# Patient Record
Sex: FEMALE | Employment: OTHER | URBAN - METROPOLITAN AREA
[De-identification: names, ages, dates, MRNs, and addresses within clinical notes are randomized per-mention and may not be internally consistent; named-entity substitution may affect disease eponyms.]

---

## 2024-06-10 PROBLEM — C85.92: Status: ACTIVE | Noted: 2024-06-10

## 2024-06-10 PROBLEM — I10 ESSENTIAL HYPERTENSION: Status: ACTIVE | Noted: 2024-06-10

## 2024-06-10 PROBLEM — R42 LIGHTHEADED: Status: ACTIVE | Noted: 2024-06-10

## 2024-06-10 PROBLEM — I49.9 CARDIAC ARRHYTHMIA: Status: ACTIVE | Noted: 2024-06-10

## 2024-06-10 PROBLEM — C82.58: Status: ACTIVE | Noted: 2018-06-12

## 2024-06-10 PROBLEM — I38 VHD (VALVULAR HEART DISEASE): Status: ACTIVE | Noted: 2024-06-10

## 2024-06-10 PROBLEM — Z80.0 FAMILY HX OF COLON CANCER: Status: ACTIVE | Noted: 2024-06-10

## 2024-06-10 PROBLEM — M25.512 ACUTE PAIN OF LEFT SHOULDER: Status: ACTIVE | Noted: 2018-08-23

## 2024-06-10 PROBLEM — R00.2 PALPITATIONS: Status: ACTIVE | Noted: 2024-06-10

## 2024-06-10 PROBLEM — Z95.0 CARDIAC PACEMAKER: Status: ACTIVE | Noted: 2024-06-10

## 2024-06-10 RX ORDER — LOSARTAN POTASSIUM 50 MG/1
50 TABLET ORAL DAILY
COMMUNITY

## 2024-06-10 RX ORDER — POLYETHYLENE GLYCOL 3350 17 G/17G
17 POWDER, FOR SOLUTION ORAL DAILY
COMMUNITY

## 2024-06-10 RX ORDER — METOPROLOL SUCCINATE 25 MG/1
25 TABLET, EXTENDED RELEASE ORAL DAILY
COMMUNITY

## 2024-06-10 RX ORDER — ACETAMINOPHEN 500 MG
5000 TABLET ORAL DAILY
COMMUNITY

## 2024-06-10 RX ORDER — VITAMIN E MIXED 400 UNIT
400 CAPSULE ORAL DAILY
COMMUNITY

## 2024-06-10 RX ORDER — PNV NO.95/FERROUS FUM/FOLIC AC 28MG-0.8MG
100 TABLET ORAL DAILY
COMMUNITY

## 2024-06-10 RX ORDER — CALCIUM CARBONATE 300MG(750)
400 TABLET,CHEWABLE ORAL DAILY
COMMUNITY

## 2024-06-10 RX ORDER — COD LIVER OIL
OIL (ML) ORAL
COMMUNITY

## 2024-06-10 RX ORDER — HYDROGEN PEROXIDE 3 %
20 SOLUTION, NON-ORAL MISCELLANEOUS
COMMUNITY

## 2024-06-10 RX ORDER — DABIGATRAN ETEXILATE 150 MG/1
150 CAPSULE ORAL 2 TIMES DAILY
COMMUNITY

## 2024-06-10 RX ORDER — POTASSIUM &MAGNESIUM ASPARTATE 250-250 MG
1 CAPSULE ORAL DAILY
COMMUNITY

## 2024-06-13 ENCOUNTER — OFFICE VISIT (OUTPATIENT)
Dept: CARDIOLOGY | Facility: CLINIC | Age: 89
End: 2024-06-13
Payer: MEDICARE

## 2024-06-13 VITALS
HEIGHT: 63 IN | WEIGHT: 170 LBS | HEART RATE: 64 BPM | SYSTOLIC BLOOD PRESSURE: 112 MMHG | DIASTOLIC BLOOD PRESSURE: 66 MMHG | BODY MASS INDEX: 30.12 KG/M2

## 2024-06-13 DIAGNOSIS — R00.2 PALPITATIONS: Primary | ICD-10-CM

## 2024-06-13 PROCEDURE — 93000 ELECTROCARDIOGRAM COMPLETE: CPT | Performed by: INTERNAL MEDICINE

## 2024-06-13 PROCEDURE — 3078F DIAST BP <80 MM HG: CPT | Performed by: INTERNAL MEDICINE

## 2024-06-13 PROCEDURE — 3074F SYST BP LT 130 MM HG: CPT | Performed by: INTERNAL MEDICINE

## 2024-06-13 PROCEDURE — 99204 OFFICE O/P NEW MOD 45 MIN: CPT | Performed by: INTERNAL MEDICINE

## 2024-06-13 PROCEDURE — 1159F MED LIST DOCD IN RCRD: CPT | Performed by: INTERNAL MEDICINE

## 2024-06-13 PROCEDURE — 1036F TOBACCO NON-USER: CPT | Performed by: INTERNAL MEDICINE

## 2024-06-13 NOTE — PROGRESS NOTES
[facility] Electrophysiology    Patient Name: Joi Walker  Patient : 10/8/1930    Referred  for   Chief Complaint   Patient presents with    New Patient Visit        Joi Walker is a 89 y.o. year old female patient with    Syncope with bifascicular block: According to chart, underwent AV MICRA implant in [place] due to syncope and second degree AV block. Overall has been feeling well since although had recurrent near syncope afterwards and apparently started on beta blockers due to VT on interrogation?. No episodes since. Feels well except feeling tired.      Past Medical History:  She has a past medical history of Arrhythmia, Deep vein thrombosis (Multi), and GERD (gastroesophageal reflux disease).    Past Surgical History:  She has a past surgical history that includes Hysterectomy; Cholecystectomy; and Insert / replace / remove pacemaker.      Social History:  She reports that she has never smoked. She has never used smokeless tobacco. She reports that she does not currently use alcohol. She reports that she does not use drugs.    Family History:  No family history on file.     Allergies:  Iodinated contrast media, Codeine, Epinephrine, Iodine, and Prednisone    Outpatient Medications:  Current Outpatient Medications   Medication Instructions    cholecalciferol (VITAMIN D3) 5,000 Units, oral, Daily    cod liver oiL oil oral    cranberry 500 mg capsule 1 capsule, oral, Daily    cyanocobalamin (VITAMIN B-12) 100 mcg, oral, Daily    dabigatran etexilate (PRADAXA) 150 mg, oral, 2 times daily, Do not crush or chew.    docosahexaenoic acid/epa (FISH OIL ORAL) 1 capsule, oral, Daily    esomeprazole (NEXIUM) 20 mg, oral, Daily before breakfast, Do not open capsule.    losartan (COZAAR) 50 mg, oral, Daily    magnesium oxide (MAG-OX) 400 mg, oral, Daily    metoprolol succinate XL (TOPROL-XL) 25 mg, oral, Daily, Do not crush or chew.    polyethylene glycol (GLYCOLAX, MIRALAX) 17 g, oral, Daily     "saccharomyces boulardii (Florastor) 250 mg capsule 250 mg, oral, Daily    vitamin E 400 Units, oral, Daily    wheat dextrin (BENEFIBER CLEAR SF, DEXTRIN, ORAL) oral    ZINC ACETATE ORAL Mouth/Throat        ROS:  A 14 point review of systems was done and is negative other than as stated in HPI    Vitals:  Vitals:    06/13/24 0833   BP: 112/66   Pulse: 64   Weight: 77.1 kg (170 lb)   Height: 1.588 m (5' 2.5\")       Physical Exam:   Physical Exam  Cardiovascular:      Rate and Rhythm: Normal rate and regular rhythm.   Pulmonary:      Effort: Pulmonary effort is normal.   Musculoskeletal:         General: No swelling.   Skin:     Findings: No rash.   Neurological:      General: No focal deficit present.      Mental Status: She is alert and oriented to person, place, and time.   Psychiatric:         Mood and Affect: Mood normal.          Intake/Output:   No intake/output data recorded.    Outpatient Medications  Current Outpatient Medications on File Prior to Visit   Medication Sig Dispense Refill    cholecalciferol (Vitamin D3) 5,000 Units tablet Take 1 tablet (5,000 Units) by mouth once daily.      cod liver oiL oil Take by mouth.      cranberry 500 mg capsule Take 1 capsule by mouth once daily.      cyanocobalamin (Vitamin B-12) 100 mcg tablet Take 1 tablet (100 mcg) by mouth once daily.      dabigatran etexilate (Pradaxa) 150 mg capsule Take 1 capsule (150 mg) by mouth 2 times a day. Do not crush or chew.      docosahexaenoic acid/epa (FISH OIL ORAL) Take 1 capsule by mouth once daily.      esomeprazole (NexIUM) 20 mg DR capsule Take 1 capsule (20 mg) by mouth once daily in the morning. Take before meals. Do not open capsule.      losartan (Cozaar) 50 mg tablet Take 1 tablet (50 mg) by mouth once daily.      magnesium oxide (Mag-Ox) 400 mg tablet Take 1 tablet (400 mg) by mouth once daily.      metoprolol succinate XL (Toprol-XL) 25 mg 24 hr tablet Take 1 tablet (25 mg) by mouth once daily. Do not crush or chew.      " polyethylene glycol (Glycolax, Miralax) 17 gram packet Take 17 g by mouth once daily.      vitamin E 180 mg (400 unit) capsule Take 1 capsule (400 Units) by mouth once daily.      wheat dextrin (BENEFIBER CLEAR SF, DEXTRIN, ORAL) Take by mouth.      ZINC ACETATE ORAL Use in the mouth or throat.      saccharomyces boulardii (Florastor) 250 mg capsule Take 250 mg by mouth once daily.       No current facility-administered medications on file prior to visit.       Labs: (past 26 weeks)  No results found for this or any previous visit (from the past 4368 hour(s)).    ECG  No results found for this or any previous visit (from the past 4464 hour(s)).    Echocardiogram  No results found for this or any previous visit from the past 1095 days.      Assessment/Plan:     Patient s/p MICRA implant will refer to our device clinic for followup. With regards to her lightheadedness/near syncope, I am unsure if this is related to a cardiac arrhythmia. Nevertheless, I would not make any further changes to her medications. Will observe via device clinic.

## 2024-08-05 ENCOUNTER — HOSPITAL ENCOUNTER (OUTPATIENT)
Dept: CARDIOLOGY | Facility: HOSPITAL | Age: 89
Discharge: HOME | End: 2024-08-05
Payer: MEDICARE

## 2024-08-05 DIAGNOSIS — R94.31 ABNORMAL ELECTROCARDIOGRAM (ECG) (EKG): ICD-10-CM

## 2024-08-05 DIAGNOSIS — Z95.0 PRESENCE OF CARDIAC PACEMAKER: Primary | ICD-10-CM

## 2024-08-05 DIAGNOSIS — Z95.0 PRESENCE OF CARDIAC PACEMAKER: ICD-10-CM

## 2024-08-05 DIAGNOSIS — R00.2 PALPITATIONS: ICD-10-CM

## 2024-08-05 PROCEDURE — 93279 PRGRMG DEV EVAL PM/LDLS PM: CPT

## 2024-09-09 ENCOUNTER — HOSPITAL ENCOUNTER (OUTPATIENT)
Dept: CARDIOLOGY | Facility: HOSPITAL | Age: 89
Discharge: HOME | End: 2024-09-09
Payer: MEDICARE

## 2024-09-09 DIAGNOSIS — Z95.0 PRESENCE OF CARDIAC PACEMAKER: ICD-10-CM

## 2024-09-09 DIAGNOSIS — R00.2 PALPITATIONS: ICD-10-CM

## 2024-09-09 DIAGNOSIS — Z95.0 PRESENCE OF CARDIAC PACEMAKER: Primary | ICD-10-CM

## 2024-10-24 ENCOUNTER — HOSPITAL ENCOUNTER (OUTPATIENT)
Dept: CARDIOLOGY | Facility: HOSPITAL | Age: 89
Discharge: HOME | End: 2024-10-24
Payer: MEDICARE

## 2024-10-24 DIAGNOSIS — R00.2 PALPITATIONS: ICD-10-CM

## 2024-10-24 DIAGNOSIS — Z95.0 PRESENCE OF CARDIAC PACEMAKER: ICD-10-CM

## 2024-12-11 ENCOUNTER — HOSPITAL ENCOUNTER (OUTPATIENT)
Dept: CARDIOLOGY | Facility: HOSPITAL | Age: 89
Discharge: HOME | End: 2024-12-11
Payer: MEDICARE

## 2024-12-11 DIAGNOSIS — R00.2 PALPITATIONS: ICD-10-CM

## 2024-12-11 DIAGNOSIS — Z95.0 PRESENCE OF CARDIAC PACEMAKER: Primary | ICD-10-CM

## 2024-12-11 DIAGNOSIS — Z95.0 PRESENCE OF CARDIAC PACEMAKER: ICD-10-CM

## 2024-12-11 PROCEDURE — 93296 REM INTERROG EVL PM/IDS: CPT

## 2025-05-06 ENCOUNTER — HOSPITAL ENCOUNTER (OUTPATIENT)
Dept: CARDIOLOGY | Facility: HOSPITAL | Age: OVER 89
Discharge: HOME | End: 2025-05-06
Payer: MEDICARE

## 2025-05-06 DIAGNOSIS — Z95.0 PRESENCE OF CARDIAC PACEMAKER: ICD-10-CM

## 2025-05-06 DIAGNOSIS — R00.2 PALPITATIONS: Primary | ICD-10-CM

## 2025-05-06 PROCEDURE — 93296 REM INTERROG EVL PM/IDS: CPT

## 2025-08-06 ENCOUNTER — HOSPITAL ENCOUNTER (OUTPATIENT)
Dept: CARDIOLOGY | Facility: HOSPITAL | Age: OVER 89
Discharge: HOME | End: 2025-08-06
Payer: MEDICARE

## 2025-08-06 DIAGNOSIS — R00.2 PALPITATIONS: ICD-10-CM

## 2025-08-06 DIAGNOSIS — R00.2 PALPITATIONS: Primary | ICD-10-CM

## 2025-08-06 PROCEDURE — 93279 PRGRMG DEV EVAL PM/LDLS PM: CPT
